# Patient Record
Sex: FEMALE | HISPANIC OR LATINO | Employment: UNEMPLOYED | URBAN - METROPOLITAN AREA
[De-identification: names, ages, dates, MRNs, and addresses within clinical notes are randomized per-mention and may not be internally consistent; named-entity substitution may affect disease eponyms.]

---

## 2023-08-14 ENCOUNTER — APPOINTMENT (EMERGENCY)
Dept: CT IMAGING | Facility: HOSPITAL | Age: 53
End: 2023-08-14

## 2023-08-14 ENCOUNTER — APPOINTMENT (EMERGENCY)
Dept: RADIOLOGY | Facility: HOSPITAL | Age: 53
End: 2023-08-14

## 2023-08-14 ENCOUNTER — HOSPITAL ENCOUNTER (OUTPATIENT)
Facility: HOSPITAL | Age: 53
Setting detail: OBSERVATION
Discharge: HOME | End: 2023-08-15
Attending: EMERGENCY MEDICINE | Admitting: STUDENT IN AN ORGANIZED HEALTH CARE EDUCATION/TRAINING PROGRAM

## 2023-08-14 DIAGNOSIS — R07.9 CHEST PAIN: ICD-10-CM

## 2023-08-14 DIAGNOSIS — E03.9 HYPOTHYROIDISM: ICD-10-CM

## 2023-08-14 DIAGNOSIS — U07.1 COVID: ICD-10-CM

## 2023-08-14 DIAGNOSIS — G45.9 TIA (TRANSIENT ISCHEMIC ATTACK): ICD-10-CM

## 2023-08-14 DIAGNOSIS — R29.810 FACIAL DROOP: Primary | ICD-10-CM

## 2023-08-14 LAB
ANION GAP SERPL CALCULATED.3IONS-SCNC: 9 MMOL/L
APTT PPP: 28 SECONDS (ref 23–37)
BUN SERPL-MCNC: 14 MG/DL (ref 5–25)
CALCIUM SERPL-MCNC: 10 MG/DL (ref 8.4–10.2)
CARDIAC TROPONIN I PNL SERPL HS: <2 NG/L
CARDIAC TROPONIN I PNL SERPL HS: <2 NG/L
CHLORIDE SERPL-SCNC: 101 MMOL/L (ref 96–108)
CO2 SERPL-SCNC: 26 MMOL/L (ref 21–32)
CREAT SERPL-MCNC: 0.74 MG/DL (ref 0.6–1.3)
ERYTHROCYTE [DISTWIDTH] IN BLOOD BY AUTOMATED COUNT: 13.8 % (ref 11.6–15.1)
FLUAV RNA RESP QL NAA+PROBE: NEGATIVE
FLUBV RNA RESP QL NAA+PROBE: NEGATIVE
GFR SERPL CREATININE-BSD FRML MDRD: 93 ML/MIN/1.73SQ M
GLUCOSE SERPL-MCNC: 135 MG/DL (ref 65–140)
GLUCOSE SERPL-MCNC: 149 MG/DL (ref 65–140)
HCT VFR BLD AUTO: 45.2 % (ref 34.8–46.1)
HGB BLD-MCNC: 14.9 G/DL (ref 11.5–15.4)
INR PPP: 1.04 (ref 0.84–1.19)
MCH RBC QN AUTO: 28.9 PG (ref 26.8–34.3)
MCHC RBC AUTO-ENTMCNC: 33 G/DL (ref 31.4–37.4)
MCV RBC AUTO: 88 FL (ref 82–98)
PLATELET # BLD AUTO: 268 THOUSANDS/UL (ref 149–390)
PMV BLD AUTO: 9.9 FL (ref 8.9–12.7)
POTASSIUM SERPL-SCNC: 3.3 MMOL/L (ref 3.5–5.3)
PROTHROMBIN TIME: 13.4 SECONDS (ref 11.6–14.5)
RBC # BLD AUTO: 5.16 MILLION/UL (ref 3.81–5.12)
RSV RNA RESP QL NAA+PROBE: NEGATIVE
SARS-COV-2 RNA RESP QL NAA+PROBE: POSITIVE
SODIUM SERPL-SCNC: 136 MMOL/L (ref 135–147)
WBC # BLD AUTO: 6.86 THOUSAND/UL (ref 4.31–10.16)

## 2023-08-14 PROCEDURE — 36415 COLL VENOUS BLD VENIPUNCTURE: CPT

## 2023-08-14 PROCEDURE — 99285 EMERGENCY DEPT VISIT HI MDM: CPT

## 2023-08-14 PROCEDURE — 70498 CT ANGIOGRAPHY NECK: CPT

## 2023-08-14 PROCEDURE — 99223 1ST HOSP IP/OBS HIGH 75: CPT

## 2023-08-14 PROCEDURE — 0241U HB NFCT DS VIR RESP RNA 4 TRGT: CPT

## 2023-08-14 PROCEDURE — 86140 C-REACTIVE PROTEIN: CPT

## 2023-08-14 PROCEDURE — 82948 REAGENT STRIP/BLOOD GLUCOSE: CPT

## 2023-08-14 PROCEDURE — 83036 HEMOGLOBIN GLYCOSYLATED A1C: CPT

## 2023-08-14 PROCEDURE — 71045 X-RAY EXAM CHEST 1 VIEW: CPT

## 2023-08-14 PROCEDURE — 84439 ASSAY OF FREE THYROXINE: CPT

## 2023-08-14 PROCEDURE — 93005 ELECTROCARDIOGRAM TRACING: CPT

## 2023-08-14 PROCEDURE — 82550 ASSAY OF CK (CPK): CPT

## 2023-08-14 PROCEDURE — 84484 ASSAY OF TROPONIN QUANT: CPT

## 2023-08-14 PROCEDURE — 85730 THROMBOPLASTIN TIME PARTIAL: CPT

## 2023-08-14 PROCEDURE — 85610 PROTHROMBIN TIME: CPT

## 2023-08-14 PROCEDURE — 80061 LIPID PANEL: CPT

## 2023-08-14 PROCEDURE — 70496 CT ANGIOGRAPHY HEAD: CPT

## 2023-08-14 PROCEDURE — 83880 ASSAY OF NATRIURETIC PEPTIDE: CPT

## 2023-08-14 PROCEDURE — 85027 COMPLETE CBC AUTOMATED: CPT

## 2023-08-14 PROCEDURE — 84443 ASSAY THYROID STIM HORMONE: CPT

## 2023-08-14 PROCEDURE — 80048 BASIC METABOLIC PNL TOTAL CA: CPT

## 2023-08-14 RX ORDER — BUTALBITAL, ACETAMINOPHEN AND CAFFEINE 50; 325; 40 MG/1; MG/1; MG/1
1 TABLET ORAL ONCE
Status: COMPLETED | OUTPATIENT
Start: 2023-08-15 | End: 2023-08-15

## 2023-08-14 RX ORDER — ACETAMINOPHEN 325 MG/1
650 TABLET ORAL ONCE
Status: DISCONTINUED | OUTPATIENT
Start: 2023-08-14 | End: 2023-08-14

## 2023-08-14 RX ORDER — LOSARTAN POTASSIUM 50 MG/1
100 TABLET ORAL DAILY
Status: DISCONTINUED | OUTPATIENT
Start: 2023-08-15 | End: 2023-08-15 | Stop reason: HOSPADM

## 2023-08-14 RX ORDER — HYDROCHLOROTHIAZIDE 25 MG/1
25 TABLET ORAL DAILY
Status: DISCONTINUED | OUTPATIENT
Start: 2023-08-15 | End: 2023-08-15 | Stop reason: HOSPADM

## 2023-08-14 RX ORDER — AMLODIPINE BESYLATE 5 MG/1
5 TABLET ORAL DAILY
Status: DISCONTINUED | OUTPATIENT
Start: 2023-08-15 | End: 2023-08-15 | Stop reason: HOSPADM

## 2023-08-14 RX ORDER — LEVOTHYROXINE SODIUM 175 UG/1
175 TABLET ORAL
Status: DISCONTINUED | OUTPATIENT
Start: 2023-08-15 | End: 2023-08-15 | Stop reason: HOSPADM

## 2023-08-14 RX ORDER — ACETAMINOPHEN 325 MG/1
650 TABLET ORAL EVERY 6 HOURS PRN
Status: DISCONTINUED | OUTPATIENT
Start: 2023-08-14 | End: 2023-08-15 | Stop reason: HOSPADM

## 2023-08-14 RX ORDER — POTASSIUM CHLORIDE 20 MEQ/1
40 TABLET, EXTENDED RELEASE ORAL ONCE
Status: COMPLETED | OUTPATIENT
Start: 2023-08-14 | End: 2023-08-15

## 2023-08-14 RX ORDER — PANTOPRAZOLE SODIUM 40 MG/1
40 TABLET, DELAYED RELEASE ORAL
Status: DISCONTINUED | OUTPATIENT
Start: 2023-08-15 | End: 2023-08-15 | Stop reason: HOSPADM

## 2023-08-14 RX ORDER — ATORVASTATIN CALCIUM 10 MG/1
10 TABLET, FILM COATED ORAL
Status: DISCONTINUED | OUTPATIENT
Start: 2023-08-15 | End: 2023-08-15 | Stop reason: HOSPADM

## 2023-08-14 RX ORDER — ENOXAPARIN SODIUM 100 MG/ML
40 INJECTION SUBCUTANEOUS EVERY 12 HOURS SCHEDULED
Status: DISCONTINUED | OUTPATIENT
Start: 2023-08-14 | End: 2023-08-15 | Stop reason: HOSPADM

## 2023-08-14 RX ORDER — CLOPIDOGREL BISULFATE 75 MG/1
75 TABLET ORAL DAILY
Status: DISCONTINUED | OUTPATIENT
Start: 2023-08-15 | End: 2023-08-15 | Stop reason: HOSPADM

## 2023-08-14 RX ADMIN — IOHEXOL 85 ML: 350 INJECTION, SOLUTION INTRAVENOUS at 20:33

## 2023-08-14 NOTE — LETTER
[address] [place] [address] [address] [place] [address]  Dept: [phone]    August 15, 2023     Patient: Mary Beth Kiser   YOB: 1970   Date of Visit: 8/14/2023       To Whom it May Concern:    Mary Beth Kiser is under my professional care. She was seen in the hospital from 8/14/2023 to 08/15/23. She may return to work on 08/25/2023 without limitations. If you have any questions or concerns, please don't hesitate to call.          Sincerely,          Sylvia Reeves MD

## 2023-08-15 VITALS
HEIGHT: 67 IN | RESPIRATION RATE: 18 BRPM | BODY MASS INDEX: 44.43 KG/M2 | OXYGEN SATURATION: 95 % | TEMPERATURE: 97.5 F | SYSTOLIC BLOOD PRESSURE: 125 MMHG | HEART RATE: 72 BPM | WEIGHT: 283.07 LBS | DIASTOLIC BLOOD PRESSURE: 76 MMHG

## 2023-08-15 LAB
4HR DELTA HS TROPONIN: >2 NG/L
BNP SERPL-MCNC: 15 PG/ML (ref 0–100)
CARDIAC TROPONIN I PNL SERPL HS: 4 NG/L
CHOLEST SERPL-MCNC: 142 MG/DL
CK SERPL-CCNC: 105 U/L (ref 26–192)
CRP SERPL QL: 2.6 MG/L
EST. AVERAGE GLUCOSE BLD GHB EST-MCNC: 108 MG/DL
HBA1C MFR BLD: 5.4 %
HDLC SERPL-MCNC: 42 MG/DL
LDLC SERPL CALC-MCNC: 87 MG/DL (ref 0–100)
T4 FREE SERPL-MCNC: 0.51 NG/DL (ref 0.61–1.12)
TRIGL SERPL-MCNC: 67 MG/DL
TSH SERPL DL<=0.05 MIU/L-ACNC: 41.35 UIU/ML (ref 0.45–4.5)

## 2023-08-15 PROCEDURE — 84484 ASSAY OF TROPONIN QUANT: CPT

## 2023-08-15 PROCEDURE — 99244 OFF/OP CNSLTJ NEW/EST MOD 40: CPT | Performed by: PSYCHIATRY & NEUROLOGY

## 2023-08-15 PROCEDURE — 99239 HOSP IP/OBS DSCHRG MGMT >30: CPT | Performed by: INTERNAL MEDICINE

## 2023-08-15 PROCEDURE — 94760 N-INVAS EAR/PLS OXIMETRY 1: CPT

## 2023-08-15 PROCEDURE — 94660 CPAP INITIATION&MGMT: CPT

## 2023-08-15 RX ORDER — AZITHROMYCIN 500 MG/1
500 TABLET, FILM COATED ORAL DAILY
Qty: 5 TABLET | Refills: 0 | Status: SHIPPED | OUTPATIENT
Start: 2023-08-15 | End: 2023-08-20

## 2023-08-15 RX ORDER — LEVOTHYROXINE SODIUM 175 UG/1
175 TABLET ORAL
Qty: 30 TABLET | Refills: 0 | Status: SHIPPED | OUTPATIENT
Start: 2023-08-16

## 2023-08-15 RX ADMIN — HYDROCHLOROTHIAZIDE 25 MG: 25 TABLET ORAL at 08:21

## 2023-08-15 RX ADMIN — AMLODIPINE BESYLATE 5 MG: 5 TABLET ORAL at 08:21

## 2023-08-15 RX ADMIN — LEVOTHYROXINE SODIUM 175 MCG: 175 TABLET ORAL at 06:06

## 2023-08-15 RX ADMIN — ASPIRIN 81 MG: 81 TABLET, COATED ORAL at 08:21

## 2023-08-15 RX ADMIN — ACETAMINOPHEN 650 MG: 325 TABLET, FILM COATED ORAL at 07:52

## 2023-08-15 RX ADMIN — CLOPIDOGREL BISULFATE 75 MG: 75 TABLET ORAL at 08:21

## 2023-08-15 RX ADMIN — BUTALBITAL, ACETAMINOPHEN, AND CAFFEINE 1 TABLET: 325; 50; 40 TABLET ORAL at 00:33

## 2023-08-15 RX ADMIN — ENOXAPARIN SODIUM 40 MG: 40 INJECTION SUBCUTANEOUS at 08:22

## 2023-08-15 RX ADMIN — POTASSIUM CHLORIDE 40 MEQ: 1500 TABLET, EXTENDED RELEASE ORAL at 00:33

## 2023-08-15 RX ADMIN — ENOXAPARIN SODIUM 40 MG: 40 INJECTION SUBCUTANEOUS at 01:08

## 2023-08-15 RX ADMIN — PANTOPRAZOLE SODIUM 40 MG: 40 TABLET, DELAYED RELEASE ORAL at 06:06

## 2023-08-15 RX ADMIN — LOSARTAN POTASSIUM 100 MG: 50 TABLET, FILM COATED ORAL at 08:21

## 2023-08-15 NOTE — ASSESSMENT & PLAN NOTE
· Controlled with diet and exercise  ·   · To check A1c and lipid panel  · To recheck sugar on a.m.  BMP

## 2023-08-15 NOTE — DISCHARGE SUMMARY
[address]  Discharge- Toña Vasquez 1970, 46 y.o. female MRN: [id number]  Unit/Bed#: MS [id number] Encounter: [id number]  Primary Care Provider: No primary care provider on file. Date and time admitted to hospital: 8/14/2023  8:17 PM    Discharge Diagnosis:    COVID-19, mild, patient is on room air  Sinusitis  Facial droop, resolved  Mechanical chest wall pain, resolved  History of migraines  Hypertension  Hx of TIA  History of Hashimoto thyroiditis  Uncontrolled hypothyroidism  Diabetes  Hypokalemia resolved    Discharging Physician / Practitioner: Eliza Chiang MD  PCP: No primary care provider on file. Admission Date:   Admission Orders (From admission, onward)     Ordered        08/14/23 2312  Place in Observation  Once                              Discharge Date: 08/15/23      Consultations During Hospital Stay:  · Neurology     Outpatient follow up Requested:  · PCP  · Endocrinology    Complications:  None    Reason for Admission: Facial droop    HPI:  Yaw Brunner is a 46 y.o. female with a PMH of Hashimoto's thyroiditis, diabetes, TIA, and HTN who presents with facial droop and on/off chest pain. Patient reports on/off chest pain and bodily flushing throughout today. While in route to the emergency department she reports development of right-sided facial droop which resolved on its own. Shortly after facial droop resolution she began to experience a left temporal headache. Affirms prior history of complex migraines for which she follows outpatient neurology in [facility]. Denies other acute symptoms/pain at this time. All questions answered at the bedside to the best my ability. Hospital Course: The patient was hospitalized. She was seen by neurology. Her symptoms resolved completely without any intervention. Patient does have a history of migraines which could be attributed to her symptoms potentially.   Fortunately currently her neurologic exam is benign and she has no focal deficits. Her chest wall pain has resolved. Patient was noted to be incidentally positive for COVID, in regards to that she is in room air and hemodynamically stable. She does mention some sinus pressure likely because of sinusitis given the above. Given patient's neurologic exam being completely normal and she is being otherwise stable she has been cleared for discharge. She is receiving some azithromycin for sinusitis. She should follow-up with primary care physician    Condition at Discharge: good     Discharge Day Visit / Exam:     Subjective:    Patient evaluated this morning  She feels much better, essentially back to baseline. She denies any chest pain nausea vomiting. No focal neurological deficits or headache. Vitals: Blood Pressure: 125/76 (08/15/23 0700)  Pulse: 72 (08/15/23 0700)  Temperature: 97.5 °F (36.4 °C) (08/15/23 0700)  Temp Source: Oral (08/15/23 0700)  Respirations: 18 (08/15/23 0700)  Height: 5' 7" (170.2 cm) (08/14/23 2003)  Weight - Scale: 128 kg (283 lb 1.1 oz) (08/15/23 0015)  SpO2: 95 % (08/15/23 0745)     Exam:   Physical Exam  Vitals and nursing note reviewed. Constitutional:       Appearance: Normal appearance. She is normal weight. Comments: Female in bed, awake   HENT:      Head: Normocephalic and atraumatic. Right Ear: External ear normal.      Left Ear: External ear normal.      Nose: Nose normal. No congestion. Mouth/Throat:      Mouth: Mucous membranes are moist.      Pharynx: Oropharynx is clear. No oropharyngeal exudate or posterior oropharyngeal erythema. Eyes:      General: No scleral icterus. Right eye: No discharge. Left eye: No discharge. Extraocular Movements: Extraocular movements intact. Conjunctiva/sclera: Conjunctivae normal.      Pupils: Pupils are equal, round, and reactive to light. Cardiovascular:      Rate and Rhythm: Normal rate and regular rhythm.       Pulses: Normal pulses. Heart sounds: Normal heart sounds. No murmur heard. No friction rub. No gallop. Pulmonary:      Effort: Pulmonary effort is normal. No respiratory distress. Breath sounds: Normal breath sounds. No stridor. No wheezing, rhonchi or rales. Chest:      Chest wall: No tenderness. Abdominal:      General: Abdomen is flat. Bowel sounds are normal. There is no distension. Palpations: Abdomen is soft. There is no mass. Tenderness: There is no abdominal tenderness. There is no guarding or rebound. Musculoskeletal:         General: No swelling, tenderness, deformity or signs of injury. Normal range of motion. Cervical back: Normal range of motion and neck supple. No rigidity. No muscular tenderness. Skin:     General: Skin is warm and dry. Capillary Refill: Capillary refill takes less than 2 seconds. Coloration: Skin is not jaundiced or pale. Findings: No bruising, erythema, lesion or rash. Neurological:      General: No focal deficit present. Mental Status: She is alert and oriented to person, place, and time. Mental status is at baseline. Cranial Nerves: No cranial nerve deficit. Sensory: No sensory deficit. Motor: No weakness. Coordination: Coordination normal.   Psychiatric:         Mood and Affect: Mood normal.         Behavior: Behavior normal.         Thought Content: Thought content normal.         Judgment: Judgment normal.         Discussion with Family: Patient    Discharge instructions/Information to patient and family:   See after visit summary for information provided to patient and family. Provisions for Follow-Up Care:  See after visit summary for information related to follow-up care and any pertinent home health orders.       Disposition:     Home    For Discharges to [facility] SNF:   · Not Applicable to this Patient - Not Applicable to this Patient    Planned Readmission: No     Discharge Statement:  I spent 95 minutes discharging the patient. This time was spent on the day of discharge. I had direct contact with the patient on the day of discharge. Greater than 50% of the total time was spent examining patient, answering all patient questions, arranging and discussing plan of care with patient as well as directly providing post-discharge instructions. Additional time then spent on discharge activities. Discharge Medications:  See after visit summary for reconciled discharge medications provided to patient and family.       ** Please Note: This note has been constructed using a voice recognition system **

## 2023-08-15 NOTE — RESPIRATORY THERAPY NOTE
08/15/23 0109   Respiratory Assessment   Resp Comments placed pt on cpap for HS use, RN aware   O2 Device [id number]   Non-Invasive Information   O2 Interface Device Nasal mask  (per pt request, size small)   Non-Invasive Ventilation Mode CPAP   $ Continous NIV Initial   SpO2 95 %   $ Pulse Oximetry Spot Check Charge Completed   Non-Invasive Settings   FiO2 (%) 21   PEEP/CPAP (cm H2O) 7   Rise Time 2   Humidification   (dry circuit, n/a)   Non-Invasive Readings   Skin Intervention Skin intact   Total Rate 12   Spontaneous Vt (mL) 970   Spontaneous MV (mL) 11.4   Heater Temperature (Obs)   (n/a)   Leak (lpm) 0   Non-Invasive Alarms   Insp Pressure High (cm H20) 30   Insp Pressure Low (cm H20) 8   Low Insp Pressure Time (sec) 20 sec   MV Low (L/min) 2   Vt High (mL) 1200   Vt Low (mL) 200   High Resp Rate (BPM) 30 BPM   Low Resp Rate (BPM) 8 BPM   Apnea Interval (sec) 20   Pt states her home unit pressure setting to be "7" and denies need for supplemental o2, pt requests the nasal mask, scrubber in use

## 2023-08-15 NOTE — CONSULTS
Consultation - Neurology   Darryl Florentino 46 y.o. female MRN: [id number]  Unit/Bed#: -01 Encounter: [id number]      Assessment/Plan     * transient facial droop; suspect complex migraine  Assessment & Plan  Per chart review,  Darryl Florentino is a 46 y.o. female with obesity, LUCILLE, TIA, HLD, DM, HTN and Hashimotos's disease who presented to the ED 23 for evaluation of facial droop and intermittent chest pain. The patient reported intermittent chest pain and feeling flushed throughout the day. En route to the hospital she developed a right sided facial droop that resolved shortly thereafter without intervention that was followed by a left temporal headache. She reports a history of complex migraines managed by her neurologist in [place]. Stroke alert initiated in the ED with NIHSS 0. Initial /89, . CTH/CTA without acute intracranial findings. Labs revealed COVID + and TSH 41.3. She received fioricet x1 for her headache and and was admitted for further workup with neurology consult. Of note, she has had prior hospitalizations/work-up for similar presentations    Work-up  Imagin23 CTH:No acute intracranial abnormality. 23 CTA:0% stenosis of the bilateral cervical internal carotid arteries by NASCET criteria. Patent vertebral arteries. No evidence of large aneurysm, high-grade stenosis, or large vessel occlusion. Diffuse enlargement of thyroid gland. Correlation with TSH levels is advised. If necessary, thyroid ultrasound can be obtained.   Pertinent labs:  Lipid panel: 142/67/42/87  A1c pending  TSH 41.3  K 3.3  Troponin WNL  COVID +  Relevant home meds:  Asa 81mg daily  plavix 75mg daily  atorvastatin 10mg daily  Recommendations:  Transient facial droop in the setting of COVID and complex migraine  - Check outpatient p2y12 as well as outpatient neurology follow up  - Hemoglobin A1c pending  - TSH elevated; check T3/T4  - Continue home DAPT with Aspirin 81 mg and plavix 75mg daily  - Continue home Atorvastatin 10 mg daily  - Normotension, Euglycemic goal  - Continue to monitor and notify neurology with any changes. - If patient develops migraine, consider cocktail of benadryl 25mg, reglan 10mg and toradol 15mg iv x1 as well as magnesium sulfate 1gm iv x1  - Medical management and correction of any metabolic or infectious disturbances per primary service. Patient advised to follow up with her established neurologist in [place] in about 4-6 weeks after discharge    Reason for Consult / Principal Problem: stroke alert/facial droop  HPI: Per chart review,  Ryan Badillo is a 46 y.o. female with obesity, LUCILLE, TIA, HLD, DM, HTN and Hashimotos's disease who presented to the ED 8/14/23 for evaluation of facial droop and intermittent chest pain. The patient reported intermittent chest pain and feeling flushed throughout the day. En route to the hospital she developed a right sided facial droop that resolved shortly thereafter without intervention that was followed by a left temporal headache. She reports a history of complex migraines managed by her neurologist in [place]. Stroke alert initiated in the ED with NIHSS 0. Initial /89, . CTH/CTA without acute intracranial findings. Labs revealed COVID + and TSH 41.3. She received fioricet x1 for her headache and and was admitted for further workup with neurology consult. Patient states that she has had covid for the past 5 days and initially came to the ED for evaluation of her chest pain and SOB. On the way to the ED, she developed left facial droop that improved after 20 minutes, identical symptoms as prior events. Overnight, the night nurse noticed slight facial droop, however this was resolved this morning. She additionally reports a bifrontal headache that is worse with blowing her nose and thought to be related to covid/sinus disease. Her headache is unlike prior migrainous headaches that occur about once per month.  She was previously intolerant of topitimate and only uses prn tylenol. She has been on asa since 4/2022 following hospitalization for stroke workup. She was later started on plavix 11/2022 by her cardiology for possible TIA    Prior work-up at [facility] 4/2022 for possible stroke. She presented with 30 minutes of left facial weakness and RUE/RLE numbness with paresthesias concentrated in the right hand with vision changes. Facial droop witness by EMS and ED however resolved upon stroke team arrival. NIHSS 3->1->0. She apparently had at least 3 similar prior episodes without confirmed stroke, all associated with headache  CTH: No acute intracranial hemorrhage or large territorial infarction. CTA head and neck: No evidence of aneurysm, high grade stenosis, or large vessel occlusion. 0% stenosis of the bilateral cervical internal carotid arteries by NASCET criteria. MRI brain: Unremarkable brain MRI. A1c 5.7  Lipid panel: 165/64/39/118    Inpatient consult to Neurology  Consult performed by: NADYA Quach  Consult ordered by: Avinash Black PA-C        Review of Systems  Please refer to HPI    Historical Information   Past Medical History:   Diagnosis Date   • Diabetes mellitus ([address])    • Hashimoto's disease    • Hypertension    • TIA (transient ischemic attack)      No past surgical history on file. Social History   Social History     Substance and Sexual Activity   Alcohol Use Not on file     Social History     Substance and Sexual Activity   Drug Use Not on file     E-Cigarette/Vaping     E-Cigarette/Vaping Substances     Social History     Tobacco Use   Smoking Status Not on file   Smokeless Tobacco Not on file     Family History: No family history on file. Review of previous medical records was completed.      Meds/Allergies   current meds:   Current Facility-Administered Medications   Medication Dose Route Frequency   • acetaminophen (TYLENOL) tablet 650 mg  650 mg Oral Q6H PRN   • amLODIPine (NORVASC) tablet 5 mg  5 mg Oral Daily   • aspirin (ECOTRIN LOW STRENGTH) EC tablet 81 mg  81 mg Oral Daily   • atorvastatin (LIPITOR) tablet 10 mg  10 mg Oral Daily With Dinner   • clopidogrel (PLAVIX) tablet 75 mg  75 mg Oral Daily   • enoxaparin (LOVENOX) subcutaneous injection 40 mg  40 mg Subcutaneous Q12H YOLY   • hydrochlorothiazide (HYDRODIURIL) tablet 25 mg  25 mg Oral Daily   • levothyroxine tablet 175 mcg  175 mcg Oral Early Morning   • losartan (COZAAR) tablet 100 mg  100 mg Oral Daily   • pantoprazole (PROTONIX) EC tablet 40 mg  40 mg Oral Early Morning    and PTA meds:   None       Allergies   Allergen Reactions   • Penicillin V Hives       Objective   Vitals:Blood pressure 125/76, pulse 72, temperature 97.5 °F (36.4 °C), temperature source Oral, resp. rate 18, height 5' 7" (1.702 m), weight 128 kg (283 lb 1.1 oz), SpO2 95 %. ,Body mass index is 44.34 kg/m². No intake or output data in the 24 hours ending 08/15/23 1045    Invasive Devices: Invasive Devices     Peripheral Intravenous Line  Duration           Peripheral IV 08/14/23 Right <1 day                Physical Exam  Vitals reviewed. Constitutional:       General: She is not in acute distress. Appearance: She is obese. HENT:      Head: Normocephalic and atraumatic. Neurological:      Mental Status: She is alert and oriented to person, place, and time. Cranial Nerves: Cranial nerves 2-12 are intact. Motor: Motor strength is normal.     Coordination: Finger-Nose-Finger Test normal.      Gait: Gait is intact. Deep Tendon Reflexes:      Reflex Scores:       Tricep reflexes are 1+ on the right side and 1+ on the left side. Bicep reflexes are 1+ on the right side and 1+ on the left side. Brachioradialis reflexes are 1+ on the right side and 1+ on the left side. Patellar reflexes are 1+ on the right side and 1+ on the left side.   Psychiatric:         Speech: Speech normal.       Exam performed by  [place]  Neurologic Exam     Mental Status   Oriented to person, place, and time. Attention: normal. Concentration: normal.   Speech: speech is normal   Level of consciousness: alert    Cranial Nerves   Cranial nerves II through XII intact. Motor Exam     Strength   Strength 5/5 throughout. Sensory Exam   Light touch normal.   Pinprick normal.     Gait, Coordination, and Reflexes     Gait  Gait: normal    Coordination   Finger to nose coordination: normal    Tremor   Resting tremor: absent    Reflexes   Right brachioradialis: 1+  Left brachioradialis: 1+  Right biceps: 1+  Left biceps: 1+  Right triceps: 1+  Left triceps: 1+  Right patellar: 1+  Left patellar: 1+  Right plantar: normal  Left plantar: normal      Lab Results:   CBC:   Results from last 7 days   Lab Units 08/14/23 2030   WBC Thousand/uL 6.86   RBC Million/uL 5.16*   HEMOGLOBIN g/dL 14.9   HEMATOCRIT % 45.2   MCV fL 88   PLATELETS Thousands/uL 268   , BMP/CMP:   Results from last 7 days   Lab Units 08/14/23 2030   SODIUM mmol/L 136   POTASSIUM mmol/L 3.3*   CHLORIDE mmol/L 101   CO2 mmol/L 26   BUN mg/dL 14   CREATININE mg/dL 0.74   CALCIUM mg/dL 10.0   EGFR ml/min/1.73sq m 93   , Vitamin B12:   , HgBA1C:   Results from last 7 days   Lab Units 08/14/23  2341   HEMOGLOBIN A1C % 5.4   , TSH:   Results from last 7 days   Lab Units 08/14/23  2341   TSH 3RD GENERATON uIU/mL 41.348*   , Coagulation:   Results from last 7 days   Lab Units 08/14/23 2030   INR  1.04   , Lipid Profile:   Results from last 7 days   Lab Units 08/14/23  2341   HDL mg/dL 42*   LDL CALC mg/dL 87   TRIGLYCERIDES mg/dL 67   , Ammonia:   , Urinalysis:       Invalid input(s): "URIBILINOGEN", Drug Screen:   , Medication Drug Levels:       Invalid input(s): "CARBAMAZEPINE", "LACOSAMIDE", "OXCARBAZEPINE"     Imaging Studies: I have personally reviewed pertinent reports.    and I have personally reviewed pertinent films in PACS     EKG, Pathology, and Other Studies: I have personally reviewed pertinent reports. Code Status: Level 1 - Full Code    Counseling / Coordination of Care  Assessment performed by Dr. Ondina Jorge. Images and plan reviewed with Dr. Ondina Jorge.  Plan discussed with patient and primary service

## 2023-08-15 NOTE — ASSESSMENT & PLAN NOTE
Patient reports on/off chest pain and bodily flushing throughout today. While in route to the emergency department she reports development of right-sided facial droop which resolved on its own. Shortly after facial droop resolution she began to experience a left temporal headache. Affirms prior history of complex migraines for which she follows outpatient neurology in [facility]. Denies other acute symptoms/pain at this time.     /59   Pulse 69   Temp 98 °F (36.7 °C) (Tympanic)   Resp 21   Ht 5' 7" (1.702 m)   Wt 127 kg (280 lb)   SpO2 95%   BMI 43.85 kg/m²     · Hx of complex migraines follows w/ outpatient neurology in [place]  · Reports development of right sided facial droop around 2200; resolved without intervention shortly thereafter   · Afterwards developed left-sided HA overlying temple   · CTA head/neck negative for acute process   · Not hypoglycemic; potassium 3.3 as below, no other major electrode abnormalities  · ED reports discussion w/ neurology who recommend admission for further observation  · q4H neuro necks overnight  · PRN pain control   · Neurology consulted; recommendations appreciated

## 2023-08-15 NOTE — ASSESSMENT & PLAN NOTE
· CTA head/neck showing diffuse enlargement of the thyroid gland  · Reports history of Hashimoto thyroiditis, for which she follows with outpatient endocrinology  · TSH ordered; follow-up results  · Continue home levothyroxine

## 2023-08-15 NOTE — MALNUTRITION/BMI
This medical record reflects one or more clinical indicators suggestive of morbid obesity. BMI Findings:  Adult BMI Classifications: Morbid Obesity 40-44.9        Body mass index is 44.34 kg/m². See Nutrition note dated 8/15/23 for additional details. Completed nutrition assessment is viewable in the nutrition documentation.

## 2023-08-15 NOTE — H&P
[address]  H&P  Name: Tg Gu 46 y.o. female I MRN: [id number]  Unit/Bed#: ED 24 I Date of Admission: 8/14/2023   Date of Service: 8/15/2023 I Hospital Day: 0      Assessment/Plan   * Facial droop  Assessment & Plan  Patient reports on/off chest pain and bodily flushing throughout today. While in route to the emergency department she reports development of right-sided facial droop which resolved on its own. Shortly after facial droop resolution she began to experience a left temporal headache. Affirms prior history of complex migraines for which she follows outpatient neurology in [facility]. Denies other acute symptoms/pain at this time.     /60   Pulse 69   Temp 98 °F (36.7 °C) (Tympanic)   Resp 14   Ht 5' 7" (1.702 m)   Wt 127 kg (280 lb)   SpO2 95%   BMI 43.85 kg/m²     · Hx of complex migraines follows w/ outpatient neurology in [place]  · Reports development of right sided facial droop around 2200; resolved without intervention shortly thereafter   · Afterwards developed left-sided HA overlying temple   · CTA head/neck negative for acute process   · Not hypoglycemic; potassium 3.3 as below, no other major electrode abnormalities  · ED reports discussion w/ neurology who recommend admission for further observation  · q4H neuro necks overnight  · PRN pain control   · Neurology consulted; recommendations appreciated     [address] [place] [address]  · COVID-positive today  · Meeting mild criteria without oxygen requirement  · Denies infectious S/S currently; reports around 1 week ago was experiencing URI symptoms and malaise  · Subcu Lovenox 40 mg every 12 hours for VTE prophylaxis ordered per COVID protocol  · To check CK CRP and BNP per COVID protocol  · Supportive care  · Continuous O2 monitoring    Chest pain  Assessment & Plan  · On/off chest pain with associated flushing throughout today  · Trop less than 2; EKG nonischemic  · Suspecting musculoskeletal/pleuritic chest pain, rule out ACS  · Telemetry monitoring  · Trend remainder of troponin/EKG  · As needed pain control    Hypokalemia  Assessment & Plan  · Potassium 3.3  · Replete and recheck    Obesity  Assessment & Plan  · BMI 43.85  · Recommend weight loss via healthy diet and exercise    Abnormal CT scan  Assessment & Plan  · CTA head/neck showing diffuse enlargement of the thyroid gland  · Reports history of Hashimoto thyroiditis, for which she follows with outpatient endocrinology  · TSH ordered; follow-up results  · Continue home levothyroxine    Hypertension  Assessment & Plan  · /59  · Not currently on outpatient antihypertensive  · Monitor BP per unit protocol    Diabetes mellitus ([address])  Assessment & Plan  · Controlled with diet and exercise  ·   · To check A1c and lipid panel  · To recheck sugar on a.m. BMP    VTE Pharmacologic Prophylaxis: VTE Score: 3 Moderate Risk (Score 3-4) - Pharmacological DVT Prophylaxis Ordered: enoxaparin (Lovenox). Code Status: Level 1 - Full Code   Discussion with family: update in AM.     Anticipated Length of Stay: Patient will be admitted on an observation basis with an anticipated length of stay of less than 2 midnights secondary to facial droop. Chief Complaint: facial droop and on/off chest pain    History of Present Illness:  Stephen Bullock is a 46 y.o. female with a PMH of Hashimoto's thyroiditis, diabetes, TIA, and HTN who presents with facial droop and on/off chest pain. Patient reports on/off chest pain and bodily flushing throughout today. While in route to the emergency department she reports development of right-sided facial droop which resolved on its own. Shortly after facial droop resolution she began to experience a left temporal headache. Affirms prior history of complex migraines for which she follows outpatient neurology in [facility]. Denies other acute symptoms/pain at this time.  All questions answered at the bedside to the best my ability. Review of Systems:  Review of Systems   Constitutional: Negative for activity change, appetite change, chills, diaphoresis, fatigue and fever. HENT: Negative for congestion, ear pain, nosebleeds and trouble swallowing. Eyes: Negative for pain and visual disturbance. Respiratory: Negative for apnea, cough, chest tightness, shortness of breath and wheezing. Cardiovascular: Positive for chest pain (on/off). Negative for palpitations and leg swelling. Gastrointestinal: Negative for abdominal distention, abdominal pain, blood in stool, constipation, diarrhea, nausea and vomiting. Endocrine: Negative for cold intolerance, heat intolerance and polyuria. +Hot flashes    Genitourinary: Negative for difficulty urinating, dysuria, flank pain and hematuria. Musculoskeletal: Negative for arthralgias, neck pain and neck stiffness. Skin: Negative for color change, rash and wound. Neurological: Positive for facial asymmetry and headaches. Negative for dizziness, tremors, syncope, weakness, light-headedness and numbness. Hematological: Negative for adenopathy. All other systems reviewed and are negative. Past Medical and Surgical History:   Past Medical History:   Diagnosis Date   • Diabetes mellitus ([address])    • Hashimoto's disease    • Hypertension    • TIA (transient ischemic attack)        No past surgical history on file. Meds/Allergies:  Prior to Admission medications    Not on File     I have reviewed home medications with patient personally. Allergies:    Allergies   Allergen Reactions   • Penicillin V Hives       Social History:  Marital Status: /Civil Union   Occupation: N/A  Patient Pre-hospital Living Situation: Home  Patient Pre-hospital Level of Mobility: walks  Patient Pre-hospital Diet Restrictions: Diabetic   Substance Use History:   Social History     Substance and Sexual Activity   Alcohol Use Not on file     Social History     Tobacco Use   Smoking Status Not on file   Smokeless Tobacco Not on file     Social History     Substance and Sexual Activity   Drug Use Not on file       Family History:  No family history on file. Physical Exam:     Vitals:   Blood Pressure: 123/60 (08/14/23 2330)  Pulse: 69 (08/14/23 2330)  Temperature: 98 °F (36.7 °C) (08/14/23 2003)  Temp Source: Tympanic (08/14/23 2003)  Respirations: 14 (08/14/23 2330)  Height: 5' 7" (170.2 cm) (08/14/23 2003)  Weight - Scale: 127 kg (280 lb) (08/14/23 2003)  SpO2: 95 % (08/14/23 2330)    Physical Exam  Vitals and nursing note reviewed. Constitutional:       General: She is not in acute distress. Appearance: Normal appearance. She is obese. HENT:      Head: Normocephalic and atraumatic. Right Ear: External ear normal.      Left Ear: External ear normal.      Nose: Nose normal.      Mouth/Throat:      Mouth: Mucous membranes are moist.   Eyes:      Extraocular Movements: Extraocular movements intact. Pupils: Pupils are equal, round, and reactive to light. Cardiovascular:      Rate and Rhythm: Normal rate and regular rhythm. Pulses: Normal pulses. Heart sounds: Normal heart sounds. No murmur heard. Pulmonary:      Effort: Pulmonary effort is normal. No respiratory distress. Breath sounds: Normal breath sounds. Abdominal:      General: Bowel sounds are normal. There is no distension. Palpations: Abdomen is soft. There is no mass. Tenderness: There is no guarding. Musculoskeletal:         General: No swelling or tenderness. Cervical back: Normal range of motion and neck supple. No rigidity or tenderness. Skin:     General: Skin is warm and dry. Capillary Refill: Capillary refill takes less than 2 seconds. Findings: No lesion or rash. Neurological:      General: No focal deficit present. Mental Status: She is alert and oriented to person, place, and time. Motor: No weakness.    Psychiatric:         Mood and Affect: Mood normal. Additional Data:     Lab Results:  Results from last 7 days   Lab Units 08/14/23 2030   WBC Thousand/uL 6.86   HEMOGLOBIN g/dL 14.9   HEMATOCRIT % 45.2   PLATELETS Thousands/uL 268     Results from last 7 days   Lab Units 08/14/23 2030   SODIUM mmol/L 136   POTASSIUM mmol/L 3.3*   CHLORIDE mmol/L 101   CO2 mmol/L 26   BUN mg/dL 14   CREATININE mg/dL 0.74   ANION GAP mmol/L 9   CALCIUM mg/dL 10.0   GLUCOSE RANDOM mg/dL 149*     Results from last 7 days   Lab Units 08/14/23 2030   INR  1.04     Results from last 7 days   Lab Units 08/14/23 2047   POC GLUCOSE mg/dl 135               Lines/Drains:  Invasive Devices     Peripheral Intravenous Line  Duration           Peripheral IV 08/14/23 Right <1 day                    Imaging: Reviewed radiology reports from this admission including: CTA head/neck   CTA stroke alert (head/neck)   Final Result by Amisha Niño DO (08/14 2111)      0% stenosis of the bilateral cervical internal carotid arteries by NASCET criteria. Patent vertebral arteries. No evidence of large aneurysm, high-grade stenosis, or large vessel occlusion. Diffuse enlargement of thyroid gland. Correlation with TSH levels is advised. If necessary, thyroid ultrasound can be obtained. Findings were directly discussed with Emma Bennett at 9:05 p.m. on 8/14/2023. Workstation performed: [id number]         CT stroke alert brain   Final Result by Amisha Niño DO (08/14 2105)      No acute intracranial abnormality. Findings were directly discussed with Emma Bennett at 9:05 PM on 8/14/2023. Workstation performed: [id number]         XR chest portable    (Results Pending)       EKG and Other Studies Reviewed on Admission:   · EKG: NSR. HR 86.    ** Please Note: This note has been constructed using a voice recognition system.  **

## 2023-08-15 NOTE — ASSESSMENT & PLAN NOTE
Per chart review,  Ryan Badillo is a 46 y.o. female with obesity, LUCILLE, TIA, HLD, DM, HTN and Hashimotos's disease who presented to the ED 23 for evaluation of facial droop and intermittent chest pain. The patient reported intermittent chest pain and feeling flushed throughout the day. En route to the hospital she developed a right sided facial droop that resolved shortly thereafter without intervention that was followed by a left temporal headache. She reports a history of complex migraines managed by her neurologist in [place]. Stroke alert initiated in the ED with NIHSS 0. Initial /89, . CTH/CTA without acute intracranial findings. Labs revealed COVID + and TSH 41.3. She received fioricet x1 for her headache and and was admitted for further workup with neurology consult. Of note, she has had prior hospitalizations/work-up for similar presentations    Work-up  Imagin23 CTH:No acute intracranial abnormality. 23 CTA:0% stenosis of the bilateral cervical internal carotid arteries by NASCET criteria. Patent vertebral arteries. No evidence of large aneurysm, high-grade stenosis, or large vessel occlusion. Diffuse enlargement of thyroid gland. Correlation with TSH levels is advised. If necessary, thyroid ultrasound can be obtained. Pertinent labs:  Lipid panel: 142/67/42/87  A1c pending  TSH 41.3  K 3.3  Troponin WNL  COVID +  Relevant home meds:  Asa 81mg daily  plavix 75mg daily  atorvastatin 10mg daily  Recommendations:  Transient facial droop in the setting of COVID and complex migraine  - Check outpatient p2y12 as well as outpatient neurology follow up  - Hemoglobin A1c pending  - TSH elevated; check T3/T4  - Continue home DAPT with Aspirin 81 mg and plavix 75mg daily  - Continue home Atorvastatin 10 mg daily  - Normotension, Euglycemic goal  - Continue to monitor and notify neurology with any changes.   - If patient develops migraine, consider cocktail of benadryl 25mg, reglan 10mg and toradol 15mg iv x1 as well as magnesium sulfate 1gm iv x1  - Medical management and correction of any metabolic or infectious disturbances per primary service.

## 2023-08-15 NOTE — ASSESSMENT & PLAN NOTE
· On/off chest pain with associated flushing throughout today  · Trop less than 2; EKG nonischemic  · Suspecting musculoskeletal/pleuritic chest pain, rule out ACS  · Telemetry monitoring  · Trend remainder of troponin/EKG  · As needed pain control

## 2023-08-15 NOTE — PLAN OF CARE
Problem: PAIN - ADULT  Goal: Verbalizes/displays adequate comfort level or baseline comfort level  Description: Interventions:  - Encourage patient to monitor pain and request assistance  - Assess pain using appropriate pain scale  - Administer analgesics based on type and severity of pain and evaluate response  - Implement non-pharmacological measures as appropriate and evaluate response  - Consider cultural and social influences on pain and pain management  - Notify physician/advanced practitioner if interventions unsuccessful or patient reports new pain  Outcome: Progressing     Problem: INFECTION - ADULT  Goal: Absence or prevention of progression during hospitalization  Description: INTERVENTIONS:  - Assess and monitor for signs and symptoms of infection  - Monitor lab/diagnostic results  - Monitor all insertion sites, i.e. indwelling lines, tubes, and drains  - Monitor endotracheal if appropriate and nasal secretions for changes in amount and color  - [place] appropriate cooling/warming therapies per order  - Administer medications as ordered  - Instruct and encourage patient and family to use good hand hygiene technique  - Identify and instruct in appropriate isolation precautions for identified infection/condition  Outcome: Progressing  Goal: Absence of fever/infection during neutropenic period  Description: INTERVENTIONS:  - Monitor WBC    Outcome: Progressing     Problem: SAFETY ADULT  Goal: Patient will remain free of falls  Description: INTERVENTIONS:  - Educate patient/family on patient safety including physical limitations  - Instruct patient to call for assistance with activity   - Consult OT/PT to assist with strengthening/mobility   - Keep Call bell within reach  - Keep bed low and locked with side rails adjusted as appropriate  - Keep care items and personal belongings within reach  - Initiate and maintain comfort rounds  - Make Fall Risk Sign visible to staff  - Offer Toileting every  Hours, in advance of need  - Initiate/Maintain alarm  - Obtain necessary fall risk management equipment:   - Apply yellow socks and bracelet for high fall risk patients  - Consider moving patient to room near nurses station  Outcome: Progressing  Goal: Maintain or return to baseline ADL function  Description: INTERVENTIONS:  -  Assess patient's ability to carry out ADLs; assess patient's baseline for ADL function and identify physical deficits which impact ability to perform ADLs (bathing, care of mouth/teeth, toileting, grooming, dressing, etc.)  - Assess/evaluate cause of self-care deficits   - Assess range of motion  - Assess patient's mobility; develop plan if impaired  - Assess patient's need for assistive devices and provide as appropriate  - Encourage maximum independence but intervene and supervise when necessary  - Involve family in performance of ADLs  - Assess for home care needs following discharge   - Consider OT consult to assist with ADL evaluation and planning for discharge  - Provide patient education as appropriate  Outcome: Progressing  Goal: Maintains/Returns to pre admission functional level  Description: INTERVENTIONS:  - Perform BMAT or MOVE assessment daily.   - Set and communicate daily mobility goal to care team and patient/family/caregiver. - Collaborate with rehabilitation services on mobility goals if consulted  - Perform Range of Motion  times a day. - Reposition patient every  hours.   - Dangle patient  times a day  - Stand patient  times a day  - Ambulate patient  times a day  - Out of bed to chair  times a day   - Out of bed for meal times a day  - Out of bed for toileting  - Record patient progress and toleration of activity level   Outcome: Progressing     Problem: DISCHARGE PLANNING  Goal: Discharge to home or other facility with appropriate resources  Description: INTERVENTIONS:  - Identify barriers to discharge w/patient and caregiver  - Arrange for needed discharge resources and transportation as appropriate  - Identify discharge learning needs (meds, wound care, etc.)  - Arrange for interpretive services to assist at discharge as needed  - Refer to Case Management Department for coordinating discharge planning if the patient needs post-hospital services based on physician/advanced practitioner order or complex needs related to functional status, cognitive ability, or social support system  Outcome: Progressing     Problem: Knowledge Deficit  Goal: Patient/family/caregiver demonstrates understanding of disease process, treatment plan, medications, and discharge instructions  Description: Complete learning assessment and assess knowledge base.   Interventions:  - Provide teaching at level of understanding  - Provide teaching via preferred learning methods  Outcome: Progressing

## 2023-08-15 NOTE — ED PROVIDER NOTES
History  Chief Complaint   Patient presents with   • Facial Droop     Pt c/o chest pressure and abd pain x 1 day Covid +, left sided facial droop began 10 min, Neuro exam WNL otherwise, hx of TIA and stroke     Patient is a 51-year-old female with a past medical history of diabetes, Hashimoto's, hypertension and TIA sent to the emergency department for evaluation of chest pain and facial droop. Patient reports throughout the day she has had intermittent chest tightness and pain with waves of warmth overcoming her body. Patient reports on her way into the emergency department at 750pm she noticed a right-sided facial droop. Patient reports she does not have any decrease sensation, slurring of speech, visual changes or headache. Patient ports she does have complex migraines and was previously on topiramate from her neurologist in [facility] but was discontinued secondary to slurring speech while on the medication. Patient reports she was diagnosed with COVID 1 week ago. Patient reports her chest pain is in the epigastric region and radiates upwards. Patient reports she does get a facial droop intermittently with her complex migraines but states she does not currently have a headache. She reports she is on aspirin and Plavix. Denies fevers, chills, rash, headache, weakness, dizziness, visual changes, abdominal pain, nausea, vomiting, diarrhea, constipation, shortness of breath or difficulty breathing. Does not offer any other concerns or complaints. None       Past Medical History:   Diagnosis Date   • Diabetes mellitus ([address])    • Hashimoto's disease    • Hypertension    • TIA (transient ischemic attack)        No past surgical history on file. No family history on file. I have reviewed and agree with the history as documented. E-Cigarette/Vaping     E-Cigarette/Vaping Substances          Review of Systems   Constitutional: Negative for chills and fever.    HENT: Negative for ear pain and sore throat. Eyes: Negative for pain and visual disturbance. Respiratory: Positive for chest tightness. Negative for cough and shortness of breath. Cardiovascular: Positive for chest pain. Negative for palpitations. Gastrointestinal: Negative for abdominal pain, constipation, diarrhea, nausea and vomiting. Genitourinary: Negative for dysuria and hematuria. Musculoskeletal: Negative for arthralgias and back pain. Skin: Negative for color change and rash. Neurological: Negative for seizures and syncope. Right facial droop   All other systems reviewed and are negative. Physical Exam  Physical Exam  Vitals and nursing note reviewed. Constitutional:       General: She is not in acute distress. Appearance: Normal appearance. She is well-developed. She is not ill-appearing, toxic-appearing or diaphoretic. HENT:      Head: Normocephalic and atraumatic. Right Ear: External ear normal.      Left Ear: External ear normal.      Nose: Nose normal.      Mouth/Throat:      Mouth: Mucous membranes are moist.   Eyes:      General: No scleral icterus. Right eye: No discharge. Left eye: No discharge. Conjunctiva/sclera: Conjunctivae normal.   Cardiovascular:      Rate and Rhythm: Normal rate and regular rhythm. Heart sounds: No murmur heard. Pulmonary:      Effort: Pulmonary effort is normal. No respiratory distress. Breath sounds: Normal breath sounds. Abdominal:      Palpations: Abdomen is soft. Tenderness: There is no abdominal tenderness. Musculoskeletal:         General: No swelling, deformity or signs of injury. Normal range of motion. Cervical back: Normal range of motion and neck supple. No rigidity. Skin:     General: Skin is warm and dry. Capillary Refill: Capillary refill takes less than 2 seconds. Coloration: Skin is not jaundiced. Findings: No erythema or rash. Neurological:      General: No focal deficit present. Mental Status: She is alert and oriented to person, place, and time. Mental status is at baseline. Cranial Nerves: Cranial nerve deficit present. Sensory: Sensation is intact. Motor: Motor function is intact. Coordination: Coordination is intact. Gait: Gait is intact. Gait normal.      Comments: Right sided facial droop. Uneven smile on examination. No eye involvement. NIH 1   Psychiatric:         Mood and Affect: Mood normal.         Behavior: Behavior normal.         Thought Content:  Thought content normal.         Judgment: Judgment normal.         Vital Signs  ED Triage Vitals   Temperature Pulse Respirations Blood Pressure SpO2   08/14/23 2003 08/14/23 2003 08/14/23 2003 08/14/23 2003 08/14/23 2003   98 °F (36.7 °C) 95 18 (!) 179/89 98 %      Temp Source Heart Rate Source Patient Position - Orthostatic VS BP Location FiO2 (%)   08/14/23 2003 08/14/23 2003 08/14/23 2003 08/14/23 2003 --   Tympanic Monitor Sitting Left arm       Pain Score       08/14/23 2200       3           Vitals:    08/14/23 2130 08/14/23 2200 08/14/23 2215 08/14/23 2230   BP: 113/58 121/60 124/63 117/59   Pulse: 80 79 75 73   Patient Position - Orthostatic VS: Sitting Sitting Sitting Sitting         Visual Acuity  Visual Acuity    Flowsheet Row Most Recent Value   L Pupil Size (mm) 3   R Pupil Size (mm) 3          ED Medications  Medications   acetaminophen (TYLENOL) tablet 650 mg (has no administration in time range)   iohexol (OMNIPAQUE) 350 MG/ML injection (SINGLE-DOSE) 85 mL (85 mL Intravenous Given 8/14/23 2033)       Diagnostic Studies  Results Reviewed     Procedure Component Value Units Date/Time    HS Troponin I 2hr [[id number]] Collected: 08/14/23 2236    Lab Status: Final result Specimen: Blood from Arm, Right Updated: 08/14/23 2309     hs TnI 2hr <2 ng/L      Delta 2hr hsTnI --    HS Troponin I 4hr [[id number]]     Lab Status: No result Specimen: Blood     FLU/RSV/COVID - if FLU/RSV clinically relevant [[id number]]  (Abnormal) Collected: 08/14/23 2030    Lab Status: Final result Specimen: Nares from Nose Updated: 08/14/23 2135     SARS-CoV-2 Positive     INFLUENZA A PCR Negative     INFLUENZA B PCR Negative     RSV PCR Negative    Narrative:      FOR PEDIATRIC PATIENTS - copy/paste COVID Guidelines URL to browser: [email or web address]    SARS-CoV-2 assay is a Nucleic Acid Amplification assay intended for the  qualitative detection of nucleic acid from SARS-CoV-2 in nasopharyngeal  swabs. Results are for the presumptive identification of SARS-CoV-2 RNA. Positive results are indicative of infection with SARS-CoV-2, the virus  causing COVID-19, but do not rule out bacterial infection or co-infection  with other viruses. Laboratories within the [facility] and its  territories are required to report all positive results to the appropriate  public health authorities. Negative results do not preclude SARS-CoV-2  infection and should not be used as the sole basis for treatment or other  patient management decisions. Negative results must be combined with  clinical observations, patient history, and epidemiological information. This test has not been FDA cleared or approved. This test has been authorized by FDA under an Emergency Use Authorization  (EUA). This test is only authorized for the duration of time the  declaration that circumstances exist justifying the authorization of the  emergency use of an in vitro diagnostic tests for detection of SARS-CoV-2  virus and/or diagnosis of COVID-19 infection under section 564(b)(1) of  the Act, 21 U. S.C. 612JRL-1(B)(0), unless the authorization is terminated  or revoked sooner. The test has been validated but independent review by FDA  and CLIA is pending. Test performed using Cepheid GeneXpert: This RT-PCR assay targets N2,  a region unique to SARS-CoV-2.  A conserved region in the E-gene was chosen  for pan-Sarbecovirus detection which includes SARS-CoV-2. According to CMS-2020-01-R, this platform meets the definition of high-throughput technology.     HS Troponin 0hr (reflex protocol) [[id number]]  (Normal) Collected: 08/14/23 2030    Lab Status: Final result Specimen: Blood from Arm, Right Updated: 08/14/23 2122     hs TnI 0hr <2 ng/L     Basic metabolic panel [[id number]]  (Abnormal) Collected: 08/14/23 2030    Lab Status: Final result Specimen: Blood from Arm, Right Updated: 08/14/23 2114     Sodium 136 mmol/L      Potassium 3.3 mmol/L      Chloride 101 mmol/L      CO2 26 mmol/L      ANION GAP 9 mmol/L      BUN 14 mg/dL      Creatinine 0.74 mg/dL      Glucose 149 mg/dL      Calcium 10.0 mg/dL      eGFR 93 ml/min/1.73sq m     Narrative:      [place] guidelines for Chronic Kidney Disease (CKD):   •  Stage 1 with normal or high GFR (GFR > 90 mL/min/1.73 square meters)  •  Stage 2 Mild CKD (GFR = 60-89 mL/min/1.73 square meters)  •  Stage 3A Moderate CKD (GFR = 45-59 mL/min/1.73 square meters)  •  Stage 3B Moderate CKD (GFR = 30-44 mL/min/1.73 square meters)  •  Stage 4 Severe CKD (GFR = 15-29 mL/min/1.73 square meters)  •  Stage 5 End Stage CKD (GFR <15 mL/min/1.73 square meters)  Note: GFR calculation is accurate only with a steady state creatinine    Protime-INR [[id number]]  (Normal) Collected: 08/14/23 2030    Lab Status: Final result Specimen: Blood from Arm, Right Updated: 08/14/23 2112     Protime 13.4 seconds      INR 1.04    APTT [[id number]]  (Normal) Collected: 08/14/23 2030    Lab Status: Final result Specimen: Blood from Arm, Right Updated: 08/14/23 2112     PTT 28 seconds     Fingerstick Glucose (POCT) [[id number]]  (Normal) Collected: 08/14/23 2047    Lab Status: Final result Updated: 08/14/23 2105     POC Glucose 135 mg/dl     CBC and Platelet [[id number]]  (Abnormal) Collected: 08/14/23 2030    Lab Status: Final result Specimen: Blood from Arm, Right Updated: 08/14/23 2057 WBC 6.86 Thousand/uL      RBC 5.16 Million/uL      Hemoglobin 14.9 g/dL      Hematocrit 45.2 %      MCV 88 fL      MCH 28.9 pg      MCHC 33.0 g/dL      RDW 13.8 %      Platelets 378 Thousands/uL      MPV 9.9 fL                  CTA stroke alert (head/neck)   Final Result by Azucena Kirkland DO (08/14 2111)      0% stenosis of the bilateral cervical internal carotid arteries by NASCET criteria. Patent vertebral arteries. No evidence of large aneurysm, high-grade stenosis, or large vessel occlusion. Diffuse enlargement of thyroid gland. Correlation with TSH levels is advised. If necessary, thyroid ultrasound can be obtained. Findings were directly discussed with Jelena Hawthorne at 9:05 p.m. on 8/14/2023. Workstation performed: [id number]         CT stroke alert brain   Final Result by Azucena Kirkland DO (08/14 2105)      No acute intracranial abnormality. Findings were directly discussed with Jelena Hawthorne at 9:05 PM on 8/14/2023.       Workstation performed: [id number]         XR chest portable    (Results Pending)              Procedures  Procedures         ED Course             HEART Risk Score    Flowsheet Row Most Recent Value   Heart Score Risk Calculator    History 1 Filed at: 08/14/2023 2123   ECG 1 Filed at: 08/14/2023 2123   Age 1 Filed at: 08/14/2023 2123   Risk Factors 2 Filed at: 08/14/2023 2123   Troponin 0 Filed at: 08/14/2023 2123   HEART Score 5 Filed at: 08/14/2023 2123           Stroke Assessment     Row Name 08/14/23 2231             NIH Stroke Scale    Interval Baseline      Level of Consciousness (1a.) 0      LOC Questions (1b.) 0      LOC Commands (1c.) 0      Best Gaze (2.) 0      Visual (3.) 0      Facial Palsy (4.) 1      Motor Arm, Left (5a.) 0      Motor Arm, Right (5b.) 0      Motor Leg, Left (6a.) 0      Motor Leg, Right (6b.) 0      Limb Ataxia (7.) 0      Sensory (8.) 0      Best Language (9.) 0 Dysarthria (10.) 0      Extinction and Inattention (11.) (Formerly Neglect) 0      Total 1              Flowsheet Row Most Recent Value   Thrombolytic Decision Options    Thrombolytic Decision Patient not a candidate. Patient is not a candidate options Symptoms resolved/clearly non disabling. SBIRT 22yo+    Flowsheet Row Most Recent Value   Initial Alcohol Screen: US AUDIT-C     1. How often do you have a drink containing alcohol? 0 Filed at: 08/14/2023 2009   2. How many drinks containing alcohol do you have on a typical day you are drinking? 0 Filed at: 08/14/2023 2009   3b. FEMALE Any Age, or MALE 65+: How often do you have 4 or more drinks on one occassion? 0 Filed at: 08/14/2023 2009   Audit-C Score 0 Filed at: 08/14/2023 2009   HUSAM: How many times in the past year have you. .. Used an illegal drug or used a prescription medication for non-medical reasons? Never Filed at: 08/14/2023 2009                    Medical Decision Making    This is a 80-year-old female present to the emergency department for evaluation of chest pain and facial droop. Patient reports throughout the day she has had intermittent chest tightness in the epigastric substernal region. Patient reports she gets an overwhelming warmth sensation over her body with the chest pain. Patient reports on her way to the emergency department approximately 10 minutes prior to arrival she began noticing a right-sided facial droop. Patient reports she has a history of CVA, TIA and complex migraines. Patient reports she has noticed worsening of the droop while in the waiting room. Patient is in no acute distress on initial examination, stable vital signs.     Differential diagnosis to include but is not limited to: TIA, complex migraine, CVA, ACS, STEMI, pneumonia, arrhythmia, intracranial abnormality    Initial ED Plan: Labs, imaging, EKG    ED results:    0% stenosis of the bilateral cervical internal carotid arteries by NASCET criteria. Patent vertebral arteries. No evidence of large aneurysm, high-grade stenosis, or large vessel occlusion. Diffuse enlargement of thyroid gland. Correlation with TSH levels is advised. If necessary, thyroid ultrasound can be obtained.  -No acute intracranial abnormality. 0 hour troponin: <2  Heart score: 5  NIH: 1    Discussed with Dr. Caleb Gonzalez, neurology attending on call, recommending observation. Final ED assessment: Patient is stable and well appearing. Discussed radiologic studies and laboratory results. Patient verbalized understanding and is agreeable with the plan for admission. Discussed with Paolo Hector PA-C, observation, bridging orders placed. Amount and/or Complexity of Data Reviewed  Labs: ordered. Radiology: ordered. Risk  Prescription drug management. Disposition  Final diagnoses:   Facial droop   Chest pain     Time reflects when diagnosis was documented in both MDM as applicable and the Disposition within this note     Time User Action Codes Description Comment    8/14/2023  8:15 PM Hammad Billing Add [R29.810] Facial droop     8/14/2023 11:12 PM Hammad Billing Add [R07.9] Chest pain       ED Disposition     ED Disposition   Admit    Condition   Stable    Date/Time   Mon Aug 14, 2023 11:11 PM    Comment   Case was discussed with Paolo Hector PA-C and the patient's admission status was agreed to be Admission Status: observation status to the service of Dr. Gifty Hinds . Follow-up Information    None         Patient's Medications    No medications on file       No discharge procedures on file.     PDMP Review     None          ED Provider  Electronically Signed by           Wilber Salcido PA-C  08/14/23 9728

## 2023-08-15 NOTE — ASSESSMENT & PLAN NOTE
· COVID-positive today  · Meeting mild criteria without oxygen requirement  · Denies infectious S/S  · Subcu Lovenox 40 mg every 12 hours for VTE prophylaxis ordered per COVID protocol  · To check CK CRP and BNP per COVID protocol  · Supportive care  · Continuous O2 monitoring

## 2023-08-15 NOTE — PLAN OF CARE
Problem: PAIN - ADULT  Goal: Verbalizes/displays adequate comfort level or baseline comfort level  Description: Interventions:  - Encourage patient to monitor pain and request assistance  - Assess pain using appropriate pain scale  - Administer analgesics based on type and severity of pain and evaluate response  - Implement non-pharmacological measures as appropriate and evaluate response  - Consider cultural and social influences on pain and pain management  - Notify physician/advanced practitioner if interventions unsuccessful or patient reports new pain  Outcome: Progressing     Problem: INFECTION - ADULT  Goal: Absence or prevention of progression during hospitalization  Description: INTERVENTIONS:  - Assess and monitor for signs and symptoms of infection  - Monitor lab/diagnostic results  - Monitor all insertion sites, i.e. indwelling lines, tubes, and drains  - Monitor endotracheal if appropriate and nasal secretions for changes in amount and color  - [place] appropriate cooling/warming therapies per order  - Administer medications as ordered  - Instruct and encourage patient and family to use good hand hygiene technique  - Identify and instruct in appropriate isolation precautions for identified infection/condition  Outcome: Progressing  Goal: Absence of fever/infection during neutropenic period  Description: INTERVENTIONS:  - Monitor WBC    Outcome: Progressing     Problem: SAFETY ADULT  Goal: Patient will remain free of falls  Description: INTERVENTIONS:  - Educate patient/family on patient safety including physical limitations  - Instruct patient to call for assistance with activity   - Consult OT/PT to assist with strengthening/mobility   - Keep Call bell within reach  - Keep bed low and locked with side rails adjusted as appropriate  - Keep care items and personal belongings within reach  - Initiate and maintain comfort rounds  - Make Fall Risk Sign visible to staff  - Offer Toileting every  Hours, in advance of need  - Initiate/Maintainalarm  - Obtain necessary fall risk management equipment:   - Apply yellow socks and bracelet for high fall risk patients  - Consider moving patient to room near nurses station  Outcome: Progressing  Goal: Maintain or return to baseline ADL function  Description: INTERVENTIONS:  -  Assess patient's ability to carry out ADLs; assess patient's baseline for ADL function and identify physical deficits which impact ability to perform ADLs (bathing, care of mouth/teeth, toileting, grooming, dressing, etc.)  - Assess/evaluate cause of self-care deficits   - Assess range of motion  - Assess patient's mobility; develop plan if impaired  - Assess patient's need for assistive devices and provide as appropriate  - Encourage maximum independence but intervene and supervise when necessary  - Involve family in performance of ADLs  - Assess for home care needs following discharge   - Consider OT consult to assist with ADL evaluation and planning for discharge  - Provide patient education as appropriate  Outcome: Progressing  Goal: Maintains/Returns to pre admission functional level  Description: INTERVENTIONS:  - Perform BMAT or MOVE assessment daily.   - Set and communicate daily mobility goal to care team and patient/family/caregiver. - Collaborate with rehabilitation services on mobility goals if consulted  - Perform Range of Motion  times a day. - Reposition patient every hours.   - Dangle patient  times a day  - Stand patient times a day  - Ambulate patient  times a day  - Out of bed to chair times a day   - Out of bed for meals times a day  - Out of bed for toileting  - Record patient progress and toleration of activity level   Outcome: Progressing     Problem: DISCHARGE PLANNING  Goal: Discharge to home or other facility with appropriate resources  Description: INTERVENTIONS:  - Identify barriers to discharge w/patient and caregiver  - Arrange for needed discharge resources and transportation as appropriate  - Identify discharge learning needs (meds, wound care, etc.)  - Arrange for interpretive services to assist at discharge as needed  - Refer to Case Management Department for coordinating discharge planning if the patient needs post-hospital services based on physician/advanced practitioner order or complex needs related to functional status, cognitive ability, or social support system  Outcome: Progressing     Problem: Knowledge Deficit  Goal: Patient/family/caregiver demonstrates understanding of disease process, treatment plan, medications, and discharge instructions  Description: Complete learning assessment and assess knowledge base.   Interventions:  - Provide teaching at level of understanding  - Provide teaching via preferred learning methods  Outcome: Progressing

## 2023-08-15 NOTE — UTILIZATION REVIEW
Initial Clinical Review    Admission: Date/Time/Statement:   Admission Orders (From admission, onward)     Ordered        08/14/23 2312  Place in Observation  Once                      Orders Placed This Encounter   Procedures   • Place in Observation     Standing Status:   Standing     Number of Occurrences:   1     Order Specific Question:   Level of Care     Answer:   Med Surg [16]     ED Arrival Information     Expected   -    Arrival   8/14/2023 20:00    Acuity   Emergent            Means of arrival   Walk-In    Escorted by   Self    Service   Hospitalist    Admission type   Emergency            Arrival complaint   [address]           Chief Complaint   Patient presents with   • Facial Droop     Pt c/o chest pressure and abd pain x 1 day Covid +, left sided facial droop began 10 min, Neuro exam WNL otherwise, hx of TIA and stroke       Initial Presentation: 46 y.o. female to the ED from home with complaints of left sided facial droop which started 10 minutes prior to arrival. Admitted under observation for facial droop, COVID, chest pain, hypokalemia. H/O Hashimoto's thyroiditis, diabetes, TIA, and HTN . Arrives with GCS 15, right sided facial droop, NIHSS 1. POtassium 3.3. Replete and recheck.      Date:     Day 2:      ED Triage Vitals   Temperature Pulse Respirations Blood Pressure SpO2   08/14/23 2003 08/14/23 2003 08/14/23 2003 08/14/23 2003 08/14/23 2003   98 °F (36.7 °C) 95 18 (!) 179/89 98 %      Temp Source Heart Rate Source Patient Position - Orthostatic VS BP Location FiO2 (%)   08/14/23 2003 08/14/23 2003 08/14/23 2003 08/14/23 2003 --   Tympanic Monitor Sitting Left arm       Pain Score       08/14/23 2200       3          Wt Readings from Last 1 Encounters:   08/15/23 128 kg (283 lb 1.1 oz)     Additional Vital Signs:   Time Temp Pulse Resp BP MAP (mmHg) SpO2 O2 Flow Rate (L/min) O2 Device O2 Interface Device Patient Position - Orthostatic VS   08/15/23 0700 97.5 °F (36.4 °C) 72 18 125/76 92 94 % -- None (Room air) -- Lying   08/15/23 0600 97.7 °F (36.5 °C) 69 18 128/75 93 96 % -- None (Room air) -- --   08/15/23 0400 -- 58 18 124/70 88 94 % 2 L/min CPAP -- Lying   08/15/23 02:28:03 -- 73 20 146/89 108 95 % -- -- -- --   08/15/23 0200 97.5 °F (36.4 °C) 73 20 146/89 108 95 % 2 L/min CPAP -- Lying   08/15/23 0109 -- -- -- -- -- 95 % -- -- Nasal mask  --   O2 Interface Device: per pt request, size small at 08/15/23 0109   08/15/23 00:10:52 97.5 °F (36.4 °C) 74 -- 125/76 92 95 % -- -- -- --   08/15/23 0000 97.5 °F (36.4 °C) 74 18 125/76 92 95 % -- None (Room air) -- Sitting   08/14/23 2330 -- 69 14 123/60 84 95 % -- -- -- --   08/14/23 2315 -- 65 15 121/60 84 93 % -- None (Room air) -- Sitting   08/14/23 2300 -- 69 21 122/59 85 95 % -- -- -- --   08/14/23 2230 -- 73 17 117/59 -- 95 % -- None (Room air) -- Sitting   08/14/23 2215 -- 75 14 124/63 -- 97 % -- None (Room air) -- Sitting   08/14/23 2200 -- 79 18 121/60 -- 95 % -- None (Room air) -- Sitting   08/14/23 2130 -- 80 22 113/58 -- 95 % -- None (Room air) -- Sitting   08/14/23 2115 -- 79 22 123/64 -- 95 % -- None (Room air) -- Sitting   08/14/23 2100 -- 79 18 119/58 -- 96 % -- None (Room air) -- Sitting   08/14/23 2045 -- 80 19 128/60 -- 98 % -- None (Room air) -- Sitting   08/14/23 2030 -- 82 17 138/70 -- 96 % -- None (Room air) -- --   08/14/23 2003 98 °F (36.7 °C) 95 18 179/89 Abnormal  -- 98 % -- None (Room air) -- Sitting       Pertinent Labs/Diagnostic Test Results:     CTA stroke alert (head/neck)   Final Result by Amisha Niño DO (08/14 2111)      0% stenosis of the bilateral cervical internal carotid arteries by NASCET criteria. Patent vertebral arteries. No evidence of large aneurysm, high-grade stenosis, or large vessel occlusion. Diffuse enlargement of thyroid gland. Correlation with TSH levels is advised. If necessary, thyroid ultrasound can be obtained.             Findings were directly discussed with Emma Bennett at 9: 05 p.m. on 8/14/2023. Workstation performed: [id number]         CT stroke alert brain   Final Result by Cira Snider DO (08/14 2105)      No acute intracranial abnormality. Findings were directly discussed with Maria Teresa Lamar at 9:05 PM on 8/14/2023.       Workstation performed: [id number]         XR chest portable    (Results Pending)     Results from last 7 days   Lab Units 08/14/23 2030   SARS-COV-2  Positive*     Results from last 7 days   Lab Units 08/14/23 2030   WBC Thousand/uL 6.86   HEMOGLOBIN g/dL 14.9   HEMATOCRIT % 45.2   PLATELETS Thousands/uL 268         Results from last 7 days   Lab Units 08/14/23 2030   SODIUM mmol/L 136   POTASSIUM mmol/L 3.3*   CHLORIDE mmol/L 101   CO2 mmol/L 26   ANION GAP mmol/L 9   BUN mg/dL 14   CREATININE mg/dL 0.74   EGFR ml/min/1.73sq m 93   CALCIUM mg/dL 10.0         Results from last 7 days   Lab Units 08/14/23 2047   POC GLUCOSE mg/dl 135     Results from last 7 days   Lab Units 08/14/23 2030   GLUCOSE RANDOM mg/dL 149*             No results found for: "BETA-HYDROXYBUTYRATE"               Results from last 7 days   Lab Units 08/14/23 2341   CK TOTAL U/L 105     Results from last 7 days   Lab Units 08/15/23  0042 08/14/23  2236 08/14/23 2030   HS TNI 0HR ng/L  --   --  <2   HS TNI 2HR ng/L  --  <2  --    HS TNI 4HR ng/L 4  --   --    HSTNI D4 ng/L >2  --   --          Results from last 7 days   Lab Units 08/14/23 2030   PROTIME seconds 13.4   INR  1.04   PTT seconds 28     Results from last 7 days   Lab Units 08/14/23  2341   TSH 3RD GENERATON uIU/mL 41.348*                     Results from last 7 days   Lab Units 08/14/23  2341   BNP pg/mL 15                         Results from last 7 days   Lab Units 08/14/23  2341   CRP mg/L 2.6                 Results from last 7 days   Lab Units 08/14/23 2030   INFLUENZA A PCR  Negative   INFLUENZA B PCR  Negative   RSV PCR  Negative ED Treatment:   Medication Administration from 08/14/2023 2000 to 08/15/2023 0005       Date/Time Order Dose Route Action Action by Comments     08/14/2023 2033 EDT iohexol (OMNIPAQUE) 350 MG/ML injection (SINGLE-DOSE) 85 mL 85 mL Intravenous Given Fatimah Edwards --        Past Medical History:   Diagnosis Date   • Diabetes mellitus ([address])    • Hashimoto's disease    • Hypertension    • TIA (transient ischemic attack)        Admitting Diagnosis: Chest pain [R07.9]  Facial droop [R29.810]  Age/Sex: 46 y.o. female  Admission Orders:  Scheduled Medications:  amLODIPine, 5 mg, Oral, Daily  aspirin, 81 mg, Oral, Daily  atorvastatin, 10 mg, Oral, Daily With Dinner  clopidogrel, 75 mg, Oral, Daily  enoxaparin, 40 mg, Subcutaneous, Q12H YOLY  hydrochlorothiazide, 25 mg, Oral, Daily  levothyroxine, 175 mcg, Oral, Early Morning  losartan, 100 mg, Oral, Daily  pantoprazole, 40 mg, Oral, Early Morning      Continuous IV Infusions:     PRN Meds:  acetaminophen, 650 mg, Oral, Q6H PRN        IP CONSULT TO NEUROLOGY    Network Utilization Review Department  ATTENTION: Please call with any questions or concerns to [phone] and carefully listen to the prompts so that you are directed to the right person. All voicemails are confidential.  Jono Ledesma all requests for admission clinical reviews, approved or denied determinations and any other requests to dedicated fax number below belonging to the campus where the patient is receiving treatment.  List of dedicated fax numbers for the Facilities:  [place] DENIALS (Administrative/Medical Necessity) [phone] [facility] [phone]   [address] [facility] [id number] [place] [address] [address] [phone]

## 2023-08-15 NOTE — ASSESSMENT & PLAN NOTE
· COVID-positive today  · Meeting mild criteria without oxygen requirement  · Denies infectious S/S currently; reports around 1 week ago was experiencing URI symptoms and malaise  · Subcu Lovenox 40 mg every 12 hours for VTE prophylaxis ordered per COVID protocol  · To check CK CRP and BNP per COVID protocol  · Supportive care  · Continuous O2 monitoring

## 2023-08-15 NOTE — ASSESSMENT & PLAN NOTE
Patient reports on/off chest pain and bodily flushing throughout today. While in route to the emergency department she reports development of right-sided facial droop which resolved on its own. Shortly after facial droop resolution she began to experience a left temporal headache. Affirms prior history of complex migraines for which she follows outpatient neurology in [facility]. Denies other acute symptoms/pain at this time.     /60   Pulse 69   Temp 98 °F (36.7 °C) (Tympanic)   Resp 14   Ht 5' 7" (1.702 m)   Wt 127 kg (280 lb)   SpO2 95%   BMI 43.85 kg/m²     · Hx of complex migraines follows w/ outpatient neurology in [place]  · Reports development of right sided facial droop around 2200; resolved without intervention shortly thereafter   · Afterwards developed left-sided HA overlying temple   · CTA head/neck negative for acute process   · Not hypoglycemic; potassium 3.3 as below, no other major electrode abnormalities  · ED reports discussion w/ neurology who recommend admission for further observation  · q4H neuro necks overnight  · PRN pain control   · Neurology consulted; recommendations appreciated

## 2023-08-16 LAB
ATRIAL RATE: 86 BPM
P AXIS: 64 DEGREES
PR INTERVAL: 156 MS
QRS AXIS: 61 DEGREES
QRSD INTERVAL: 98 MS
QT INTERVAL: 376 MS
QTC INTERVAL: 449 MS
T WAVE AXIS: 26 DEGREES
VENTRICULAR RATE: 86 BPM

## 2023-08-16 PROCEDURE — 93010 ELECTROCARDIOGRAM REPORT: CPT | Performed by: INTERNAL MEDICINE
